# Patient Record
Sex: MALE | Race: WHITE | Employment: FULL TIME | URBAN - METROPOLITAN AREA
[De-identification: names, ages, dates, MRNs, and addresses within clinical notes are randomized per-mention and may not be internally consistent; named-entity substitution may affect disease eponyms.]

---

## 2020-03-19 ENCOUNTER — HOSPITAL ENCOUNTER (EMERGENCY)
Facility: CLINIC | Age: 45
Discharge: HOME | End: 2020-03-19
Attending: EMERGENCY MEDICINE | Admitting: EMERGENCY MEDICINE
Payer: MEDICAID

## 2020-03-19 VITALS
RESPIRATION RATE: 18 BRPM | HEART RATE: 87 BPM | DIASTOLIC BLOOD PRESSURE: 92 MMHG | TEMPERATURE: 98.2 F | OXYGEN SATURATION: 96 % | SYSTOLIC BLOOD PRESSURE: 163 MMHG
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
## 2020-03-19 PROCEDURE — 99283 EMERGENCY DEPT VISIT LOW MDM: CPT | Performed by: EMERGENCY MEDICINE

## 2020-03-19 PROCEDURE — 99282 EMERGENCY DEPT VISIT SF MDM: CPT | Mod: 25 | Performed by: EMERGENCY MEDICINE

## 2020-03-19 RX ORDER — ACETAMINOPHEN AND CODEINE PHOSPHATE 300; 30 MG/1; MG/1
1-2 TABLET ORAL EVERY 4 HOURS PRN
Qty: 18 TABLET | Refills: 0 | Status: SHIPPED | OUTPATIENT
Start: 2020-03-19

## 2020-03-19 ASSESSMENT — ENCOUNTER SYMPTOMS: WOUND: 1

## 2020-03-19 NOTE — ED AVS SNAPSHOT
[facility], [place], Emergency Department  [address] [place] [id number]  Phone:  [phone]                                    Gatito Louise   MRN: [id number]    Department:  [facility], Emergency Department   Date of Visit:  3/19/2020           After Visit Summary Signature Page    I have received my discharge instructions, and my questions have been answered. I have discussed any challenges I see with this plan with the nurse or doctor.    ..........................................................................................................................................  Patient/Patient Representative Signature      ..........................................................................................................................................  Patient Representative Print Name and Relationship to Patient    ..................................................               ................................................  Date                                   Time    ..........................................................................................................................................  Reviewed by Signature/Title    ...................................................              ..............................................  Date                                               Time          22EPIC Rev 08/18

## 2020-03-20 ENCOUNTER — TELEPHONE (OUTPATIENT)
Dept: PLASTIC SURGERY | Facility: CLINIC | Age: 45
End: 2020-03-20

## 2020-03-20 NOTE — ED TRIAGE NOTES
Pt presented from [facility] ER, c/o dog bite on side and side of his nose. Pt referred for surgical consult.     Pt was A/O x4 on arrival, skin was normal/ warm/ dry, radial pulse was strong, respirations were non-labored. Pt's nose was bandaged prior to arrival; bleeding was controlled.     Pt stated he was bit by his own dog, stated dog's vaccinations are not current.

## 2020-03-20 NOTE — ED PROVIDER NOTES
[place] EMERGENCY DEPARTMENT ([facility])  3/19/20    History     Chief Complaint   Patient presents with     Dog Bite     The history is provided by the patient and medical records.     Gatito Louise is a 44 year old male with no significant past medical history who presents here to the Emergency Department from [facility] ED due to dog bite. Patient reports that he was bit by his Puggle earlier today around 2:30 PM on his nose. Patient had his tetanus updated, received the rabies vaccine and was started on Augmentin at [facility] ED. Patient had the bite washed out and was transferred here to the [facility] ED via private vehicle to see facial trauma. He was given a percocet prior to transfer.    I have reviewed the Medications, Allergies, Past Medical and Surgical History, and Social History in the [email or web address] system.  PAST MEDICAL HISTORY: No past medical history on file.    PAST SURGICAL HISTORY: No past surgical history on file.    FAMILY HISTORY: No family history on file.    SOCIAL HISTORY:   Social History     Tobacco Use     Smoking status: Not on file   Substance Use Topics     Alcohol use: Not on file       Patient's Medications   New Prescriptions    ACETAMINOPHEN-CODEINE (TYLENOL WITH CODEINE #3) 300-30 MG PER TABLET    Take 1-2 tablets by mouth every 4 hours as needed (Cough and discomfort)    AMOXICILLIN-CLAVULANATE (AUGMENTIN) 875-125 MG TABLET    Take 1 tablet by mouth 2 times daily for 7 days   Previous Medications    No medications on file   Modified Medications    No medications on file   Discontinued Medications    No medications on file        No Known Allergies     Review of Systems   Skin: Positive for wound (Nose).   All other systems reviewed and are negative.      Physical Exam   BP: (!) 163/92  Pulse: 87  Temp: 98.2  F (36.8  C)  Resp: 14  SpO2: 96 %      Physical Exam  Vitals signs and nursing note reviewed.   Constitutional:       General: He is not in acute distress.      Appearance: He is well-developed.   HENT:      Head: Normocephalic.      Nose:      Comments: Laceration with small piece of missing nose tip of nose right side at apex of the nare, bleeding controlled  Eyes:      Extraocular Movements: Extraocular movements intact.   Neck:      Musculoskeletal: Neck supple.   Pulmonary:      Effort: No respiratory distress.   Musculoskeletal:         General: No deformity.   Skin:     General: Skin is dry.   Neurological:      Mental Status: He is alert.      Comments: Oriented   Psychiatric:         Behavior: Behavior normal.         ED Course   7:108 PM  The patient was seen and examined by Efrain Han DO in Room ED23.        Procedures           No results found for this or any previous visit (from the past 24 hour(s)).  Medications - No data to display          Assessments & Plan (with Medical Decision Making)   44-year-old male presents to us with a chief complaint of dog bite and laceration.  He is missing a small section of the distal right part of his nose.  Patient was evaluated by facial trauma OMFS.  He has a small piece of the nose is missing rather than just lacerated they are unable to repaired at this time.  Recommend he follow-up in clinic for repair later.  We will discharge him with Augmentin as well as some pain medications.  I have reviewed the nursing notes.    I have reviewed the findings, diagnosis, plan and need for follow up with the patient.    New Prescriptions    ACETAMINOPHEN-CODEINE (TYLENOL WITH CODEINE #3) 300-30 MG PER TABLET    Take 1-2 tablets by mouth every 4 hours as needed (Cough and discomfort)    AMOXICILLIN-CLAVULANATE (AUGMENTIN) 875-125 MG TABLET    Take 1 tablet by mouth 2 times daily for 7 days       Final diagnoses:   Dog bite, initial encounter     Ibrahima CAVAZOS, am serving as a trained medical scribe to document services personally performed by Efrain Han DO, based on the provider's statements to me.   Efrain CAVAZOS  DO Guille, was physically present and have reviewed and verified the accuracy of this note documented by Ibrahima Maldonado.    3/19/2020   [facility], [place], EMERGENCY DEPARTMENT     Efrain Han DO  03/19/20 2036

## 2020-03-20 NOTE — TELEPHONE ENCOUNTER
Health Call Center    Phone Message    May a detailed message be left on voicemail: yes     Reason for Call: Other: Gatito is calling because he was in the Emergency Room last night for a dog bite to his nose. He has a small piece of his nose missing and was told to follow up with setting up a oral maxillary repair.  He was seen by Efrain Han DO in the [facility].  Please call  him as soon as possible.      Action Taken: Message routed to:  [facility]    Travel Screening: Not Applicable

## 2020-03-20 NOTE — DISCHARGE INSTRUCTIONS
Please make an appointment to follow up with oral maxillary surgery.  They should contact you to set up a follow-up appointment.  Monitor for signs of infection such as worsening pain, drainage, fever.  We are discharging you with an antibiotic Augmentin as well as some pain medications.

## 2020-03-20 NOTE — CONSULTS
ORAL & MAXILLOFACIAL SURGERY CONSULTATION - G2  Name: Gatito Louise  MRN: [id number]  : 1975  Date of Service: 2020      ASSESSMENT:  44 year old male w/ avulsive nasal tip laceration s/p dog bite.     RECOMMENDATIONS:  1. Augmentin x 7 days  2. Discharge w/ xeroform dressing, instructed to change daily  3. Pain management per ED team  4. Follow-up in OMFS clinic in 7 days to discuss reconstructive options (we will arrange)  5. Please contact resident on call with questions    The patient's case was discussed with Chief Resident, Dr. Filemon Alonzo who discussed with staff surgeon, Dr. Phan Yusuf.       CHIEF COMPLAINT:    Nasal laceration    HISTORY OF PRESENT ILLNESS:      Gatito Louise is a 44 year old male with no significant past medical history who presents here to the Emergency Department from [facility] ED due to dog bite. Patient reports that he was bit by his Puggle earlier today around 2:30 PM on his nose. Patient had his tetanus updated, received the rabies vaccine and was started on Augmentin at [facility] ED. Patient had the bite washed out and was transferred here to the [facility] ED via private vehicle to see facial trauma. He was given a percocet prior to transfer.       PAST MEDICAL HISTORY:  No past medical history on file.    PAST SURGICAL HISTORY:  No past surgical history on file.    PTA MEDICATIONS:  No current facility-administered medications for this encounter.      Current Outpatient Medications   Medication     acetaminophen-codeine (TYLENOL WITH CODEINE #3) 300-30 MG per tablet     amoxicillin-clavulanate (AUGMENTIN) 875-125 MG tablet                  ALLERGIES:   No Known Allergies     FAMILY HISTORY:  No family history on file.    SOCIAL HISTORY  Social History     Socioeconomic History     Marital status: Single     Spouse name: Not on file     Number of children: Not on file     Years of education: Not on file     Highest education level: Not on file   Occupational  History     Not on file   Social Needs     Financial resource strain: Not on file     Food insecurity     Worry: Not on file     Inability: Not on file     Transportation needs     Medical: Not on file     Non-medical: Not on file   Tobacco Use     Smoking status: Not on file   Substance and Sexual Activity     Alcohol use: Not on file     Drug use: Not on file     Sexual activity: Not on file   Lifestyle     Physical activity     Days per week: Not on file     Minutes per session: Not on file     Stress: Not on file   Relationships     Social connections     Talks on phone: Not on file     Gets together: Not on file     Attends Mandaeism service: Not on file     Active member of club or organization: Not on file     Attends meetings of clubs or organizations: Not on file     Relationship status: Not on file     Intimate partner violence     Fear of current or ex partner: Not on file     Emotionally abused: Not on file     Physically abused: Not on file     Forced sexual activity: Not on file   Other Topics Concern     Not on file   Social History Narrative     Not on file       REVIEW OF SYSTEMS:  10-point review negative for anything not mentioned above    OBJECTIVE/PHYSICAL EXAMINATION:  Vitals: Blood pressure (!) 163/92, pulse 87, temperature 98.2  F (36.8  C), temperature source Oral, resp. rate 14, SpO2 96 %.  Constitutional: no acute distress  HEENT:       Nose: Superficial lacerations to bilateral alae. 10 x 5 mm avulsive laceration of right nasal tip. Linear superficial laceration to nasal dorsum    LABORATORY, PATHOLOGY, AND RADIOLOGY DATA:  Lab results:   CBC RESULTS: No results for input(s): WBC, RBC, HGB, HCT, MCV, MCH, MCHC, RDW, PLT in the last 25508 hours.    Abran Monroe DMD  Oral and Maxillofacial Surgery PGY2  [phone]

## 2020-03-22 ENCOUNTER — INFUSION - HEALTHEAST (OUTPATIENT)
Dept: INFUSION THERAPY | Facility: CLINIC | Age: 45
End: 2020-03-22

## 2020-03-22 DIAGNOSIS — Z23 RABIES, NEED FOR PROPHYLACTIC VACCINATION AGAINST: ICD-10-CM

## 2020-03-23 NOTE — TELEPHONE ENCOUNTER
Spoke with pt regarding scheduling new consult with Dr. Acevedo. Pt scheduled for 8:30am on 3/24/20. Pt confirms appt date, time, and location. Pt to sign up for MyChart and send photographs of the area. Mariya ORTEGA RNCC

## 2020-03-24 ENCOUNTER — PRE VISIT (OUTPATIENT)
Dept: PLASTIC SURGERY | Facility: CLINIC | Age: 45
End: 2020-03-24

## 2020-03-24 ENCOUNTER — OFFICE VISIT (OUTPATIENT)
Dept: PLASTIC SURGERY | Facility: CLINIC | Age: 45
End: 2020-03-24
Payer: MEDICAID

## 2020-03-24 ENCOUNTER — TELEPHONE (OUTPATIENT)
Dept: PLASTIC SURGERY | Facility: CLINIC | Age: 45
End: 2020-03-24

## 2020-03-24 VITALS
SYSTOLIC BLOOD PRESSURE: 134 MMHG | HEART RATE: 82 BPM | WEIGHT: 219 LBS | DIASTOLIC BLOOD PRESSURE: 97 MMHG | OXYGEN SATURATION: 95 % | BODY MASS INDEX: 29.66 KG/M2 | HEIGHT: 72 IN

## 2020-03-24 DIAGNOSIS — S01.25XA OPEN WOUND OF NOSE DUE TO DOG BITE: Primary | ICD-10-CM
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
## 2020-03-24 PROBLEM — Z23 RABIES, NEED FOR PROPHYLACTIC VACCINATION AGAINST: Status: ACTIVE | Noted: 2020-03-20

## 2020-03-24 RX ORDER — ALBUTEROL SULFATE 90 UG/1
AEROSOL, METERED RESPIRATORY (INHALATION)
COMMUNITY
Start: 2019-05-04

## 2020-03-24 ASSESSMENT — PAIN SCALES - GENERAL: PAINLEVEL: MILD PAIN (2)

## 2020-03-24 ASSESSMENT — MIFFLIN-ST. JEOR: SCORE: 1921.38

## 2020-03-24 NOTE — TELEPHONE ENCOUNTER
FUTURE VISIT INFORMATION      FUTURE VISIT INFORMATION:    Date: 3/24/20    Time: 8:30am    Location: [facility]  REFERRAL INFORMATION:    Referring providers clinic:  [facility]    Reason for visit/diagnosis  dog bite    RECORDS REQUESTED FROM:       Clinic name Comments Records Status Imaging Status   [facility] ED visit 3/19/20 EPIC

## 2020-03-24 NOTE — TELEPHONE ENCOUNTER
[facility] Call Center    Phone Message    May a detailed message be left on voicemail: yes     Reason for Call: Other: Gatito is calling to inquire if he should be following any type of work restrictions. He states that he works at a [employer] in the [place] and is often around grease and with the open wound he wants to make sure he is taking precautions to avoid infection. Please give him call back to advise. Thank you.      Action Taken: Message routed to:  [facility] ([facility]): Plastic Surgery    Travel Screening: Not Applicable

## 2020-03-24 NOTE — LETTER
3/24/2020       RE: Gatito Louise  [address]  [facility] [address]     Dear Colleague,    Thank you for referring your patient, Gatito Louise, to the [facility] PLASTIC AND RECONSTRUCTIVE SURGERY at [facility]. Please see a copy of my visit note below.    PLASTIC SURGERY HISTORY AND PHYSICAL    Gatito Louise MRN# [id number]   Age: 44 year old YOB: 1975     Primary care provider: No Ref-Primary, Physician    CHIEF COMPLAINT: nasal wound    HPI: 44 year old gentleman with no PMHx presents for initial evaluation of open nasal wound. The patient was bitten on the nose by his puggle while playing together on 3/19. He noted rapid exsanguination at the time. He was then evaluated at [facility] and subsequently transferred to [facility]. He was prescribed Augmentin upon discharge and has been performing daily xeroform dressing changes and showering. Denies any fevers/chills.    PMH:  No past medical history on file.    PSH:  No past surgical history on file.    FH:  No family history on file.    SH:  Social History     Tobacco Use     Smoking status: Not on file   Substance Use Topics     Alcohol use: Not on file     Drug use: Not on file   Smokes 8 cigarettes/day, past 20+ years.      MEDS:  Current Outpatient Medications   Medication     acetaminophen-codeine (TYLENOL WITH CODEINE #3) 300-30 MG per tablet     amoxicillin-clavulanate (AUGMENTIN) 875-125 MG tablet     No current facility-administered medications for this visit.        ALLERGIES:   No Known Allergies    ROS: Negative except for HPI.    EXAM:  No acute distress  Regular  Nonlabored  RIGHT ALAR RIM full thickness defect, 0.5 x 1cm, unable to assess exposed cartilage given fibrinous eschar (partially debrided in clinic), lacerations about RIGHT alar dome and nasal tip  Warm, well-perfused    ASSESSMENT/PLAN:  44 year old gentleman s/p dog bite injury to nose with alar rim defect involving cartilage and  scattered lacerations across nasal tip. Given that patient is active smoker with several lacerations about the adjacent soft tissue which would compromise local tissue perfusion, will defer immediate surgical intervention at this time. Counseled patient on smoking cessation and that he would need to be off nicotine for at least 4 weeks prior to surgery. Also counseled patient to perform twice daily WTD dressing changes including packing the nares to gradually debride the wound. Requested that patient follow-up in 4 weeks and also to send frequent photos via [employer] to follow wound healing.    At least 30 minutes was spent with patient, of which more than 50 percent of that time is spent discussing the diagnosis, prognosis, risk and benefits, instructions for management, and education.     Shweta Acevedo MD  Pediatric Cleft and Craniofacial Surgery  Division of Plastic Surgery  [facility]  Pager: [phone]      Again, thank you for allowing me to participate in the care of your patient.      Sincerely,    Shweta Acevedo MD

## 2020-03-24 NOTE — PROGRESS NOTES
PLASTIC SURGERY HISTORY AND PHYSICAL    Gatito Louise MRN# [id number]   Age: 44 year old YOB: 1975     Primary care provider: No Ref-Primary, Physician    CHIEF COMPLAINT: nasal wound    HPI: 44 year old gentleman with no PMHx presents for initial evaluation of open nasal wound. The patient was bitten on the nose by his puggle while playing together on 3/19. He noted rapid exsanguination at the time. He was then evaluated at [facility] and subsequently transferred to [facility]. He was prescribed Augmentin upon discharge and has been performing daily xeroform dressing changes and showering. Denies any fevers/chills.    PMH:  No past medical history on file.    PSH:  No past surgical history on file.    FH:  No family history on file.    SH:  Social History     Tobacco Use     Smoking status: Not on file   Substance Use Topics     Alcohol use: Not on file     Drug use: Not on file   Smokes 8 cigarettes/day, past 20+ years.      MEDS:  Current Outpatient Medications   Medication     acetaminophen-codeine (TYLENOL WITH CODEINE #3) 300-30 MG per tablet     amoxicillin-clavulanate (AUGMENTIN) 875-125 MG tablet     No current facility-administered medications for this visit.        ALLERGIES:   No Known Allergies    ROS: Negative except for HPI.    EXAM:  No acute distress  Regular  Nonlabored  RIGHT ALAR RIM full thickness defect, 0.5 x 1cm, unable to assess exposed cartilage given fibrinous eschar (partially debrided in clinic), lacerations about RIGHT alar dome and nasal tip  Warm, well-perfused    ASSESSMENT/PLAN:  44 year old gentleman s/p dog bite injury to nose with alar rim defect involving cartilage and scattered lacerations across nasal tip. Given that patient is active smoker with several lacerations about the adjacent soft tissue which would compromise local tissue perfusion, will defer immediate surgical intervention at this time. Counseled patient on smoking cessation and that he would need to  be off nicotine for at least 4 weeks prior to surgery. Also counseled patient to perform twice daily WTD dressing changes including packing the nares to gradually debride the wound. Requested that patient follow-up in 4 weeks and also to send frequent photos via MyChart to follow wound healing.    At least 30 minutes was spent with patient, of which more than 50 percent of that time is spent discussing the diagnosis, prognosis, risk and benefits, instructions for management, and education.     Shweta Acevedo MD  Pediatric Cleft and Craniofacial Surgery  Division of Plastic Surgery  [facility]  Pager: [phone]

## 2020-03-24 NOTE — LETTER
Date:March 25, 2020      Patient was self referred, no letter generated. Do not send.        [facility] Information

## 2020-03-24 NOTE — TELEPHONE ENCOUNTER
Spoke with pt and relayed per Dr. Acevedo. No work restrictions. Pt can keep area protected with dressing. Could wear face mask over dressing for additional protection from grease. Pt states understanding and denies any additional questions or concerns. Mariya ORTEGA RNCC

## 2020-03-24 NOTE — NURSING NOTE
Chief Complaint   Patient presents with     Consult     dog bite to nose 3/19, seen in ED 3/19, dressing w/ xeroform daily       Vitals:    03/24/20 0821   BP: (!) 134/97   BP Location: Left arm   Patient Position: Chair   Cuff Size: Adult Regular   Pulse: 82   SpO2: 95%   Weight: 99.3 kg (219 lb)   Height: 1.829 m (6')       Body mass index is 29.7 kg/m .    Gil Stewart, EMT

## 2020-03-25 NOTE — ED NOTES
As of today March 25, 2020, this patient has medically urgent need to continue with the rabies vaccination series.     Abelardo Phelps MD  03/25/20 1013

## 2020-03-26 ENCOUNTER — INFUSION - HEALTHEAST (OUTPATIENT)
Dept: INFUSION THERAPY | Facility: CLINIC | Age: 45
End: 2020-03-26

## 2020-03-26 DIAGNOSIS — Z23 RABIES, NEED FOR PROPHYLACTIC VACCINATION AGAINST: ICD-10-CM

## 2020-04-02 ENCOUNTER — INFUSION - HEALTHEAST (OUTPATIENT)
Dept: INFUSION THERAPY | Facility: CLINIC | Age: 45
End: 2020-04-02

## 2020-04-02 DIAGNOSIS — Z23 RABIES, NEED FOR PROPHYLACTIC VACCINATION AGAINST: ICD-10-CM

## 2020-05-04 ENCOUNTER — PATIENT OUTREACH (OUTPATIENT)
Dept: SURGERY | Facility: CLINIC | Age: 45
End: 2020-05-04

## 2020-05-04 NOTE — PROGRESS NOTES
"Called patient in regard to his concerns with his follow up from his dog bite. Patient states he was seen in the [facility] ER and advised to come here. He presented to the [facility] 4th floor to be evaluated, however patient was supposed to present to the [facility] ER. Patient was sent to the ER and he felt no one \"knew he was coming\" even though he was advised to come to the [facility] ER. He then had a follow up appointment with Dr. Acevedo and was advised to pack his nose daily, however, each time he changed his dressing it was like reopening and reliving the experience and he did not feel he could continue this. Instead of notifying the clinic he decided to seek a second opinion.   "

## 2020-11-16 ENCOUNTER — HEALTH MAINTENANCE LETTER (OUTPATIENT)
Age: 45
End: 2020-11-16

## 2021-05-26 VITALS
TEMPERATURE: 97.5 F | OXYGEN SATURATION: 95 % | HEART RATE: 63 BPM | SYSTOLIC BLOOD PRESSURE: 137 MMHG | DIASTOLIC BLOOD PRESSURE: 92 MMHG | RESPIRATION RATE: 18 BRPM

## 2021-05-26 VITALS
HEART RATE: 86 BPM | SYSTOLIC BLOOD PRESSURE: 126 MMHG | DIASTOLIC BLOOD PRESSURE: 78 MMHG | OXYGEN SATURATION: 96 % | TEMPERATURE: 97.7 F

## 2021-05-27 VITALS
TEMPERATURE: 97.6 F | DIASTOLIC BLOOD PRESSURE: 85 MMHG | HEART RATE: 53 BPM | SYSTOLIC BLOOD PRESSURE: 129 MMHG | OXYGEN SATURATION: 98 %

## 2021-08-07 ENCOUNTER — APPOINTMENT (OUTPATIENT)
Dept: ULTRASOUND IMAGING | Facility: CLINIC | Age: 46
End: 2021-08-07
Attending: EMERGENCY MEDICINE
Payer: COMMERCIAL

## 2021-08-07 ENCOUNTER — HOSPITAL ENCOUNTER (EMERGENCY)
Facility: CLINIC | Age: 46
Discharge: HOME | End: 2021-08-07
Attending: EMERGENCY MEDICINE | Admitting: EMERGENCY MEDICINE
Payer: COMMERCIAL

## 2021-08-07 VITALS
OXYGEN SATURATION: 97 % | HEART RATE: 76 BPM | RESPIRATION RATE: 22 BRPM | BODY MASS INDEX: 28.48 KG/M2 | TEMPERATURE: 98.2 F | SYSTOLIC BLOOD PRESSURE: 125 MMHG | DIASTOLIC BLOOD PRESSURE: 79 MMHG | WEIGHT: 210 LBS

## 2021-08-07 DIAGNOSIS — L03.116 CELLULITIS OF LEFT LEG: ICD-10-CM

## 2021-08-07 DIAGNOSIS — L24.9 IRRITANT CONTACT DERMATITIS, UNSPECIFIED TRIGGER: ICD-10-CM

## 2021-08-07 LAB
ALBUMIN SERPL-MCNC: 3.9 G/DL (ref 3.5–5)
ALP SERPL-CCNC: 106 U/L (ref 45–120)
ALT SERPL W P-5'-P-CCNC: 26 U/L (ref 0–45)
ANION GAP SERPL CALCULATED.3IONS-SCNC: 8 MMOL/L (ref 5–18)
AST SERPL W P-5'-P-CCNC: 21 U/L (ref 0–40)
BASOPHILS # BLD AUTO: 0.1 10E3/UL (ref 0–0.2)
BASOPHILS NFR BLD AUTO: 1 %
BILIRUB SERPL-MCNC: 0.4 MG/DL (ref 0–1)
BUN SERPL-MCNC: 18 MG/DL (ref 8–22)
C REACTIVE PROTEIN LHE: 0.5 MG/DL (ref 0–0.8)
CALCIUM SERPL-MCNC: 9.3 MG/DL (ref 8.5–10.5)
CHLORIDE BLD-SCNC: 105 MMOL/L (ref 98–107)
CK SERPL-CCNC: 56 U/L (ref 30–190)
CO2 SERPL-SCNC: 23 MMOL/L (ref 22–31)
CREAT SERPL-MCNC: 0.78 MG/DL (ref 0.7–1.3)
D DIMER PPP FEU-MCNC: 1.15 UG/ML FEU (ref 0–0.5)
EOSINOPHIL # BLD AUTO: 0.5 10E3/UL (ref 0–0.7)
EOSINOPHIL NFR BLD AUTO: 3 %
ERYTHROCYTE [DISTWIDTH] IN BLOOD BY AUTOMATED COUNT: 12.6 % (ref 10–15)
GFR SERPL CREATININE-BSD FRML MDRD: >90 ML/MIN/1.73M2
GLUCOSE BLD-MCNC: 125 MG/DL (ref 70–125)
HCT VFR BLD AUTO: 46.5 % (ref 40–53)
HGB BLD-MCNC: 15.8 G/DL (ref 13.3–17.7)
IMM GRANULOCYTES # BLD: 0.2 10E3/UL
IMM GRANULOCYTES NFR BLD: 1 %
LYMPHOCYTES # BLD AUTO: 1.7 10E3/UL (ref 0.8–5.3)
LYMPHOCYTES NFR BLD AUTO: 11 %
MCH RBC QN AUTO: 28.7 PG (ref 26.5–33)
MCHC RBC AUTO-ENTMCNC: 34 G/DL (ref 31.5–36.5)
MCV RBC AUTO: 84 FL (ref 78–100)
MONOCYTES # BLD AUTO: 0.6 10E3/UL (ref 0–1.3)
MONOCYTES NFR BLD AUTO: 4 %
NEUTROPHILS # BLD AUTO: 12.2 10E3/UL (ref 1.6–8.3)
NEUTROPHILS NFR BLD AUTO: 80 %
NRBC # BLD AUTO: 0 10E3/UL
NRBC BLD AUTO-RTO: 0 /100
PLATELET # BLD AUTO: 247 10E3/UL (ref 150–450)
POTASSIUM BLD-SCNC: 4.6 MMOL/L (ref 3.5–5)
PROT SERPL-MCNC: 7.1 G/DL (ref 6–8)
RBC # BLD AUTO: 5.51 10E6/UL (ref 4.4–5.9)
SODIUM SERPL-SCNC: 136 MMOL/L (ref 136–145)
WBC # BLD AUTO: 15.3 10E3/UL (ref 4–11)

## 2021-08-07 PROCEDURE — 85379 FIBRIN DEGRADATION QUANT: CPT | Performed by: EMERGENCY MEDICINE

## 2021-08-07 PROCEDURE — 36415 COLL VENOUS BLD VENIPUNCTURE: CPT | Performed by: EMERGENCY MEDICINE

## 2021-08-07 PROCEDURE — 82550 ASSAY OF CK (CPK): CPT | Performed by: EMERGENCY MEDICINE

## 2021-08-07 PROCEDURE — 82247 BILIRUBIN TOTAL: CPT | Performed by: EMERGENCY MEDICINE

## 2021-08-07 PROCEDURE — 82040 ASSAY OF SERUM ALBUMIN: CPT | Performed by: EMERGENCY MEDICINE

## 2021-08-07 PROCEDURE — 86141 C-REACTIVE PROTEIN HS: CPT | Performed by: EMERGENCY MEDICINE

## 2021-08-07 PROCEDURE — 93971 EXTREMITY STUDY: CPT | Mod: LT

## 2021-08-07 PROCEDURE — 99284 EMERGENCY DEPT VISIT MOD MDM: CPT | Mod: 25

## 2021-08-07 PROCEDURE — 85025 COMPLETE CBC W/AUTO DIFF WBC: CPT | Performed by: EMERGENCY MEDICINE

## 2021-08-07 RX ORDER — HYDROXYZINE HYDROCHLORIDE 25 MG/1
50-100 TABLET, FILM COATED ORAL EVERY 6 HOURS PRN
Qty: 30 TABLET | Refills: 0 | Status: SHIPPED | OUTPATIENT
Start: 2021-08-07

## 2021-08-07 RX ORDER — SULFAMETHOXAZOLE/TRIMETHOPRIM 800-160 MG
1 TABLET ORAL 2 TIMES DAILY
Qty: 14 TABLET | Refills: 0 | Status: SHIPPED | OUTPATIENT
Start: 2021-08-07 | End: 2021-08-14

## 2021-08-07 NOTE — ED TRIAGE NOTES
Pt presents to the ED with c/o worsening rash and itchiness for the past 2 weeks. Pt has been to UC for this and given steroids and antibiotics without relief. Left calf since has swollen and become more tender. Rash has progressed to upper leg

## 2021-08-07 NOTE — ED PROVIDER NOTES
EMERGENCY DEPARTMENT ENCOUnter      NAME: Gatito Louise  AGE: 46 year old male  YOB: 1975  MRN: [id number]  EVALUATION DATE & TIME: 8/7/2021  3:53 AM    PCP: No Ref-Primary, Physician    ED PROVIDER: Fern Morris MD      Chief Complaint   Patient presents with     Rash         FINAL IMPRESSION:  1. Irritant contact dermatitis, unspecified trigger    2. Cellulitis of left leg          ED COURSE & MEDICAL DECISION MAKING:      In summary, the patient is a 46-year-old male that presents to the emergency department for evaluation of a rash on his bilateral legs thought secondary to a contact dermatitis.  Patient has been seen multiple times by the urgency room and has been on antibiotics and steroids.  He has no evidence of DVT.  His rash is complicated by cellulitis.  We will switch his antibiotic to Bactrim and use hydroxyzine for his itch.  Recommended close outpatient follow up with dermatology.  0433 Introduced myself to patient, gathered patient history, and performed an initial exam.  0610 Rechecked and updated patient on his results. I discussed the plan for discharge with the patient, and patient is agreeable. We discussed supportive cares at home and reasons for return to the ER including new or worsening symptoms - all questions and concerns addressed. Patient to be discharged by RN.       At the conclusion of the encounter I discussed the results of all of the tests and the disposition. The questions were answered. The patient or family acknowledged understanding and was agreeable with the care plan.         MEDICATIONS GIVEN IN THE EMERGENCY:  Medications - No data to display    NEW PRESCRIPTIONS STARTED AT TODAY'S ER VISIT  Discharge Medication List as of 8/7/2021  6:27 AM      START taking these medications    Details   hydrOXYzine (ATARAX) 25 MG tablet Take 2-4 tablets ( mg) by mouth every 6 hours as needed for itching, Disp-30 tablet, R-0, Local Print       sulfamethoxazole-trimethoprim (BACTRIM DS) 800-160 MG tablet Take 1 tablet by mouth 2 times daily for 7 days, Disp-14 tablet, R-0, Local Print                =================================================================    HPI        Gatito Louise is a 46 year old male with no pertinent history who presents to this ED by car for evaluation of rash. Patient presents with worsening rash and associated itching for the past two weeks. Patient states he has been at Urgent Care multiple times and is currently on his second course of steroids for diagnosis of contact dermatitis. He was give a medrol pack for his first course and is currently on 20 mg of prednisone. He notes rash was beginning to improve when it suddenly became worse and began to spread and track upwards on his leg and now in his arms. Rash is temporarily palliated by running water over his legs and arms. He notes associated leg swelling and calf tenderness.  Denies medical problems or other additional daily medications. Denies concern for STDs or history of blood clots.     Patient smokes 10 cigarettes a day and is a non-drinker. Patient works in heating and air conditioning.       REVIEW OF SYSTEMS     Constitutional:  Denies fever or chills  HENT:  Denies sore throat   Respiratory:  Denies cough or shortness of breath   Cardiovascular:  Denies chest pain or palpitations  GI:  Denies abdominal pain, nausea, or vomiting  Musculoskeletal:  Denies any new extremity pain   Skin:  Endorses rash   Neurologic:  Denies headache, focal weakness or sensory changes    All other systems reviewed and are negative      PAST MEDICAL HISTORY:  History reviewed. No pertinent past medical history.    PAST SURGICAL HISTORY:  History reviewed. No pertinent surgical history.        CURRENT MEDICATIONS:    hydrOXYzine (ATARAX) 25 MG tablet  sulfamethoxazole-trimethoprim (BACTRIM DS) 800-160 MG tablet  acetaminophen-codeine (TYLENOL WITH CODEINE #3) 300-30 MG per  tablet  albuterol (PROAIR HFA/PROVENTIL HFA/VENTOLIN HFA) 108 (90 Base) MCG/ACT inhaler        ALLERGIES:  No Known Allergies    FAMILY HISTORY:  History reviewed. No pertinent family history.    SOCIAL HISTORY:   Social History     Socioeconomic History     Marital status: Single     Spouse name: None     Number of children: None     Years of education: None     Highest education level: None   Occupational History     None   Tobacco Use     Smoking status: Current Every Day Smoker     Packs/day: 0.75     Types: Cigarettes     Smokeless tobacco: Never Used   Substance and Sexual Activity     Alcohol use: None     Drug use: None     Sexual activity: None   Other Topics Concern     None   Social History Narrative     None     Social Determinants of Health     Financial Resource Strain:      Difficulty of Paying Living Expenses:    Food Insecurity:      Worried About Running Out of Food in the Last Year:      Ran Out of Food in the Last Year:    Transportation Needs:      Lack of Transportation (Medical):      Lack of Transportation (Non-Medical):    Physical Activity:      Days of Exercise per Week:      Minutes of Exercise per Session:    Stress:      Feeling of Stress :    Social Connections:      Frequency of Communication with Friends and Family:      Frequency of Social Gatherings with Friends and Family:      Attends Judaism Services:      Active Member of Clubs or Organizations:      Attends Club or Organization Meetings:      Marital Status:    Intimate Partner Violence:      Fear of Current or Ex-Partner:      Emotionally Abused:      Physically Abused:      Sexually Abused:        PHYSICAL EXAM    Constitutional:  Well developed, Well nourished,  HENT:  Normocephalic, Atraumatic, Bilateral external ears normal, Oropharynx moist, Nose normal.   Neck:  Normal range of motion, No meningismus, No stridor.   Eyes:  EOMI, Conjunctiva normal, No discharge.   Respiratory:  Normal breath sounds, No respiratory  distress, No wheezing, No chest tenderness.   Cardiovascular:  Normal heart rate, Normal rhythm, No murmurs  GI:  Soft, No tenderness, No guarding, No CVA tenderness.   Musculoskeletal:  Neurovascularly intact distally, No edema, No tenderness, No cyanosis, Good range of motion in all major joints. No tenderness to palpation or major deformities noted.   Integument:  Warm, Dry, No erythema, No rash.   Lymphatic:  No lymphadenopathy noted.   Neurologic:  Alert & oriented x 3, Normal motor function, Normal sensory function, No focal deficits noted.   Psychiatric:  Affect normal, Judgment normal, Mood normal.      LAB:  All pertinent labs reviewed and interpreted.  Results for orders placed or performed during the hospital encounter of 08/07/21   US Lower Extremity Venous Duplex Left    Impression    IMPRESSION:  1.  No deep venous thrombosis in the left lower extremity.   Comprehensive metabolic panel   Result Value Ref Range    Sodium 136 136 - 145 mmol/L    Potassium 4.6 3.5 - 5.0 mmol/L    Chloride 105 98 - 107 mmol/L    Carbon Dioxide (CO2) 23 22 - 31 mmol/L    Anion Gap 8 5 - 18 mmol/L    Urea Nitrogen 18 8 - 22 mg/dL    Creatinine 0.78 0.70 - 1.30 mg/dL    Calcium 9.3 8.5 - 10.5 mg/dL    Glucose 125 70 - 125 mg/dL    Alkaline Phosphatase 106 45 - 120 U/L    AST 21 0 - 40 U/L    ALT 26 0 - 45 U/L    Protein Total 7.1 6.0 - 8.0 g/dL    Albumin 3.9 3.5 - 5.0 g/dL    Bilirubin Total 0.4 0.0 - 1.0 mg/dL    GFR Estimate >90 >60 mL/min/1.73m2   D dimer quantitative   Result Value Ref Range    D-Dimer Quantitative 1.15 (H) 0.00 - 0.50 ug/mL FEU   Result Value Ref Range    CK 56 30 - 190 U/L   CRP inflammation   Result Value Ref Range    CRP 0.5 0.0-<0.8 mg/dL   CBC with platelets and differential   Result Value Ref Range    WBC Count 15.3 (H) 4.0 - 11.0 10e3/uL    RBC Count 5.51 4.40 - 5.90 10e6/uL    Hemoglobin 15.8 13.3 - 17.7 g/dL    Hematocrit 46.5 40.0 - 53.0 %    MCV 84 78 - 100 fL    MCH 28.7 26.5 - 33.0 pg     MCHC 34.0 31.5 - 36.5 g/dL    RDW 12.6 10.0 - 15.0 %    Platelet Count 247 150 - 450 10e3/uL    % Neutrophils 80 %    % Lymphocytes 11 %    % Monocytes 4 %    % Eosinophils 3 %    % Basophils 1 %    % Immature Granulocytes 1 %    NRBCs per 100 WBC 0 <1 /100    Absolute Neutrophils 12.2 (H) 1.6 - 8.3 10e3/uL    Absolute Lymphocytes 1.7 0.8 - 5.3 10e3/uL    Absolute Monocytes 0.6 0.0 - 1.3 10e3/uL    Absolute Eosinophils 0.5 0.0 - 0.7 10e3/uL    Absolute Basophils 0.1 0.0 - 0.2 10e3/uL    Absolute Immature Granulocytes 0.2 (H) <=0.0 10e3/uL    Absolute NRBCs 0.0 10e3/uL       RADIOLOGY:  I have independently reviewed and interpreted the above imaging, pending the final radiology read.  US Lower Extremity Venous Duplex Left   Final Result   IMPRESSION:   1.  No deep venous thrombosis in the left lower extremity.                I, Norman Su, am serving as a scribe to document services personally performed by Dr. Morris based on my observation and the provider's statements to me. I, Fern Morris MD attest that Norman Su is acting in a scribe capacity, has observed my performance of the services and has documented them in accordance with my direction.    Fern Morris MD  Emergency Medicine  [facility] EMERGENCY ROOM  [id number] [id number] [phone]  Dept: [phone]     Fern Morris MD  08/10/21 0546

## 2021-08-07 NOTE — DISCHARGE INSTRUCTIONS
Continue your steroids as previously recommended  Follow-up with your primary care doctor Monday or Tuesday for recheck  Follow-up with dermatology within the next week for reevaluation of your rash  Take A probiotic while taking antibiotics  Benadryl 50 mg every 4 hours as needed for itch  Take A daily Claritin

## 2021-09-18 ENCOUNTER — HEALTH MAINTENANCE LETTER (OUTPATIENT)
Age: 46
End: 2021-09-18

## 2022-01-08 ENCOUNTER — HEALTH MAINTENANCE LETTER (OUTPATIENT)
Age: 47
End: 2022-01-08

## 2022-11-19 ENCOUNTER — HEALTH MAINTENANCE LETTER (OUTPATIENT)
Age: 47
End: 2022-11-19

## 2023-04-09 ENCOUNTER — HEALTH MAINTENANCE LETTER (OUTPATIENT)
Age: 48
End: 2023-04-09

## 2024-06-16 ENCOUNTER — HEALTH MAINTENANCE LETTER (OUTPATIENT)
Age: 49
End: 2024-06-16